# Patient Record
Sex: MALE | Race: WHITE | NOT HISPANIC OR LATINO | URBAN - METROPOLITAN AREA
[De-identification: names, ages, dates, MRNs, and addresses within clinical notes are randomized per-mention and may not be internally consistent; named-entity substitution may affect disease eponyms.]

---

## 2019-04-02 ENCOUNTER — EVALUATION (OUTPATIENT)
Dept: PHYSICAL THERAPY | Facility: CLINIC | Age: 69
End: 2019-04-02
Payer: MEDICARE

## 2019-04-02 DIAGNOSIS — G89.29 CHRONIC RIGHT SHOULDER PAIN: Primary | ICD-10-CM

## 2019-04-02 DIAGNOSIS — M25.511 CHRONIC RIGHT SHOULDER PAIN: Primary | ICD-10-CM

## 2019-04-02 PROCEDURE — G8991 OTHER PT/OT GOAL STATUS: HCPCS | Performed by: PHYSICAL THERAPIST

## 2019-04-02 PROCEDURE — 97110 THERAPEUTIC EXERCISES: CPT | Performed by: PHYSICAL THERAPIST

## 2019-04-02 PROCEDURE — G8990 OTHER PT/OT CURRENT STATUS: HCPCS | Performed by: PHYSICAL THERAPIST

## 2019-04-02 PROCEDURE — 97161 PT EVAL LOW COMPLEX 20 MIN: CPT | Performed by: PHYSICAL THERAPIST

## 2019-04-04 ENCOUNTER — TRANSCRIBE ORDERS (OUTPATIENT)
Dept: PHYSICAL THERAPY | Facility: CLINIC | Age: 69
End: 2019-04-04

## 2019-04-04 DIAGNOSIS — M25.511 CHRONIC RIGHT SHOULDER PAIN: Primary | ICD-10-CM

## 2019-04-04 DIAGNOSIS — G89.29 CHRONIC RIGHT SHOULDER PAIN: Primary | ICD-10-CM

## 2019-04-09 ENCOUNTER — OFFICE VISIT (OUTPATIENT)
Dept: PHYSICAL THERAPY | Facility: CLINIC | Age: 69
End: 2019-04-09
Payer: MEDICARE

## 2019-04-09 DIAGNOSIS — M25.511 CHRONIC RIGHT SHOULDER PAIN: Primary | ICD-10-CM

## 2019-04-09 DIAGNOSIS — G89.29 CHRONIC RIGHT SHOULDER PAIN: Primary | ICD-10-CM

## 2019-04-09 PROCEDURE — 97110 THERAPEUTIC EXERCISES: CPT

## 2019-04-09 PROCEDURE — 97140 MANUAL THERAPY 1/> REGIONS: CPT

## 2019-04-09 PROCEDURE — 97112 NEUROMUSCULAR REEDUCATION: CPT

## 2019-04-11 ENCOUNTER — OFFICE VISIT (OUTPATIENT)
Dept: PHYSICAL THERAPY | Facility: CLINIC | Age: 69
End: 2019-04-11
Payer: MEDICARE

## 2019-04-11 DIAGNOSIS — G89.29 CHRONIC RIGHT SHOULDER PAIN: Primary | ICD-10-CM

## 2019-04-11 DIAGNOSIS — M25.511 CHRONIC RIGHT SHOULDER PAIN: Primary | ICD-10-CM

## 2019-04-11 PROCEDURE — 97140 MANUAL THERAPY 1/> REGIONS: CPT

## 2019-04-11 PROCEDURE — 97112 NEUROMUSCULAR REEDUCATION: CPT

## 2019-04-11 PROCEDURE — 97110 THERAPEUTIC EXERCISES: CPT

## 2019-04-16 ENCOUNTER — OFFICE VISIT (OUTPATIENT)
Dept: PHYSICAL THERAPY | Facility: CLINIC | Age: 69
End: 2019-04-16
Payer: MEDICARE

## 2019-04-16 DIAGNOSIS — G89.29 CHRONIC RIGHT SHOULDER PAIN: Primary | ICD-10-CM

## 2019-04-16 DIAGNOSIS — M25.511 CHRONIC RIGHT SHOULDER PAIN: Primary | ICD-10-CM

## 2019-04-16 PROCEDURE — 97140 MANUAL THERAPY 1/> REGIONS: CPT | Performed by: PHYSICAL THERAPIST

## 2019-04-16 PROCEDURE — 97110 THERAPEUTIC EXERCISES: CPT | Performed by: PHYSICAL THERAPIST

## 2019-04-18 ENCOUNTER — OFFICE VISIT (OUTPATIENT)
Dept: PHYSICAL THERAPY | Facility: CLINIC | Age: 69
End: 2019-04-18
Payer: MEDICARE

## 2019-04-18 DIAGNOSIS — M25.511 CHRONIC RIGHT SHOULDER PAIN: Primary | ICD-10-CM

## 2019-04-18 DIAGNOSIS — G89.29 CHRONIC RIGHT SHOULDER PAIN: Primary | ICD-10-CM

## 2019-04-18 PROCEDURE — 97140 MANUAL THERAPY 1/> REGIONS: CPT

## 2019-04-18 PROCEDURE — 97110 THERAPEUTIC EXERCISES: CPT

## 2019-04-24 ENCOUNTER — APPOINTMENT (OUTPATIENT)
Dept: PHYSICAL THERAPY | Facility: CLINIC | Age: 69
End: 2019-04-24
Payer: MEDICARE

## 2019-04-26 ENCOUNTER — OFFICE VISIT (OUTPATIENT)
Dept: PHYSICAL THERAPY | Facility: CLINIC | Age: 69
End: 2019-04-26
Payer: MEDICARE

## 2019-04-26 DIAGNOSIS — G89.29 CHRONIC RIGHT SHOULDER PAIN: Primary | ICD-10-CM

## 2019-04-26 DIAGNOSIS — M25.511 CHRONIC RIGHT SHOULDER PAIN: Primary | ICD-10-CM

## 2019-04-26 PROCEDURE — 97110 THERAPEUTIC EXERCISES: CPT | Performed by: PHYSICAL THERAPIST

## 2019-04-26 PROCEDURE — 97140 MANUAL THERAPY 1/> REGIONS: CPT | Performed by: PHYSICAL THERAPIST

## 2019-04-30 ENCOUNTER — APPOINTMENT (OUTPATIENT)
Dept: PHYSICAL THERAPY | Facility: CLINIC | Age: 69
End: 2019-04-30
Payer: MEDICARE

## 2021-03-30 DIAGNOSIS — Z23 ENCOUNTER FOR IMMUNIZATION: ICD-10-CM

## 2021-06-22 ENCOUNTER — HOSPITAL ENCOUNTER (OUTPATIENT)
Dept: ULTRASOUND IMAGING | Facility: CLINIC | Age: 71
Discharge: HOME | End: 2021-06-22
Payer: MEDICARE

## 2021-06-22 DIAGNOSIS — N50.819 TESTICULAR PAIN, UNSPECIFIED: ICD-10-CM

## 2021-06-22 PROCEDURE — 76870 US EXAM SCROTUM: CPT

## 2021-10-28 ENCOUNTER — OFFICE VISIT (OUTPATIENT)
Dept: GASTROENTEROLOGY | Facility: CLINIC | Age: 71
End: 2021-10-28
Payer: MEDICARE

## 2021-10-28 VITALS
WEIGHT: 189 LBS | SYSTOLIC BLOOD PRESSURE: 140 MMHG | HEIGHT: 70 IN | HEART RATE: 73 BPM | DIASTOLIC BLOOD PRESSURE: 80 MMHG | BODY MASS INDEX: 27.06 KG/M2

## 2021-10-28 DIAGNOSIS — Z86.010 HISTORY OF COLON POLYPS: Primary | ICD-10-CM

## 2021-10-28 PROCEDURE — 99203 OFFICE O/P NEW LOW 30 MIN: CPT | Performed by: INTERNAL MEDICINE

## 2021-10-28 RX ORDER — FLUTICASONE FUROATE AND VILANTEROL TRIFENATATE 100; 25 UG/1; UG/1
POWDER RESPIRATORY (INHALATION)
COMMUNITY

## 2021-10-28 RX ORDER — CLONAZEPAM 0.5 MG/1
TABLET ORAL
COMMUNITY
Start: 2021-07-30

## 2021-10-28 RX ORDER — LISINOPRIL 5 MG/1
TABLET ORAL
COMMUNITY
Start: 2021-08-16

## 2021-10-28 RX ORDER — TADALAFIL 20 MG/1
TABLET ORAL DAILY
COMMUNITY

## 2021-10-28 RX ORDER — ROSUVASTATIN CALCIUM 10 MG/1
TABLET, COATED ORAL
COMMUNITY
Start: 2021-09-18

## 2021-11-26 ENCOUNTER — TELEPHONE (OUTPATIENT)
Dept: GASTROENTEROLOGY | Facility: HOSPITAL | Age: 71
End: 2021-11-26

## 2021-11-29 ENCOUNTER — ANESTHESIA (OUTPATIENT)
Dept: GASTROENTEROLOGY | Facility: HOSPITAL | Age: 71
End: 2021-11-29

## 2021-11-29 ENCOUNTER — ANESTHESIA EVENT (OUTPATIENT)
Dept: GASTROENTEROLOGY | Facility: HOSPITAL | Age: 71
End: 2021-11-29

## 2021-11-29 ENCOUNTER — HOSPITAL ENCOUNTER (OUTPATIENT)
Dept: GASTROENTEROLOGY | Facility: HOSPITAL | Age: 71
Setting detail: OUTPATIENT SURGERY
Discharge: HOME | End: 2021-11-29
Attending: INTERNAL MEDICINE
Payer: MEDICARE

## 2021-11-29 VITALS
RESPIRATION RATE: 18 BRPM | HEART RATE: 65 BPM | DIASTOLIC BLOOD PRESSURE: 80 MMHG | TEMPERATURE: 98.6 F | BODY MASS INDEX: 27.99 KG/M2 | OXYGEN SATURATION: 97 % | SYSTOLIC BLOOD PRESSURE: 121 MMHG | WEIGHT: 189 LBS | HEIGHT: 69 IN

## 2021-11-29 DIAGNOSIS — Z86.010 HISTORY OF COLON POLYPS: ICD-10-CM

## 2021-11-29 PROBLEM — G20.C PARKINSONIAN SYNDROME (HCC): Status: ACTIVE | Noted: 2021-11-29

## 2021-11-29 PROBLEM — G20 PARKINSONIAN SYNDROME (HCC): Status: ACTIVE | Noted: 2021-11-29

## 2021-11-29 PROCEDURE — G0105 COLORECTAL SCRN; HI RISK IND: HCPCS | Performed by: INTERNAL MEDICINE

## 2021-11-29 RX ORDER — LIDOCAINE HYDROCHLORIDE 20 MG/ML
INJECTION, SOLUTION EPIDURAL; INFILTRATION; INTRACAUDAL; PERINEURAL AS NEEDED
Status: DISCONTINUED | OUTPATIENT
Start: 2021-11-29 | End: 2021-11-29

## 2021-11-29 RX ORDER — SODIUM CHLORIDE, SODIUM LACTATE, POTASSIUM CHLORIDE, CALCIUM CHLORIDE 600; 310; 30; 20 MG/100ML; MG/100ML; MG/100ML; MG/100ML
INJECTION, SOLUTION INTRAVENOUS CONTINUOUS PRN
Status: DISCONTINUED | OUTPATIENT
Start: 2021-11-29 | End: 2021-11-29

## 2021-11-29 RX ORDER — PROPOFOL 10 MG/ML
INJECTION, EMULSION INTRAVENOUS AS NEEDED
Status: DISCONTINUED | OUTPATIENT
Start: 2021-11-29 | End: 2021-11-29

## 2021-11-29 RX ADMIN — PROPOFOL 150 MG: 10 INJECTION, EMULSION INTRAVENOUS at 09:48

## 2021-11-29 RX ADMIN — PROPOFOL 50 MG: 10 INJECTION, EMULSION INTRAVENOUS at 09:56

## 2021-11-29 RX ADMIN — PROPOFOL 50 MG: 10 INJECTION, EMULSION INTRAVENOUS at 09:52

## 2021-11-29 RX ADMIN — LIDOCAINE HYDROCHLORIDE 100 MG: 20 INJECTION, SOLUTION EPIDURAL; INFILTRATION; INTRACAUDAL; PERINEURAL at 09:48

## 2021-11-29 RX ADMIN — SODIUM CHLORIDE, SODIUM LACTATE, POTASSIUM CHLORIDE, AND CALCIUM CHLORIDE: .6; .31; .03; .02 INJECTION, SOLUTION INTRAVENOUS at 09:25

## 2023-08-29 ENCOUNTER — APPOINTMENT (EMERGENCY)
Dept: CT IMAGING | Facility: HOSPITAL | Age: 73
End: 2023-08-29
Payer: MEDICARE

## 2023-08-29 ENCOUNTER — HOSPITAL ENCOUNTER (EMERGENCY)
Facility: HOSPITAL | Age: 73
Discharge: HOME | End: 2023-08-29
Attending: EMERGENCY MEDICINE
Payer: MEDICARE

## 2023-08-29 VITALS
RESPIRATION RATE: 18 BRPM | SYSTOLIC BLOOD PRESSURE: 141 MMHG | HEART RATE: 65 BPM | DIASTOLIC BLOOD PRESSURE: 78 MMHG | OXYGEN SATURATION: 95 % | TEMPERATURE: 98.4 F

## 2023-08-29 DIAGNOSIS — H53.8 BLURRED VISION: Primary | ICD-10-CM

## 2023-08-29 PROCEDURE — 70450 CT HEAD/BRAIN W/O DYE: CPT

## 2023-08-29 PROCEDURE — 99284 EMERGENCY DEPT VISIT MOD MDM: CPT

## 2023-08-29 NOTE — ED PROVIDER NOTES
History  Chief Complaint   Patient presents with   • Blurred Vision     Blurred vision that started in right eye on Saturday after a sneeze. Sunday patient looked at a bright light and then had blurred vision in left eye. Patient was seen at [facility] doctor today and sent to ER for CT scan. Patient also reports hitting his head about 1 week or longer ago getting into a bus. Patient is a 72 y.o. male with a past medical history of prostate cancer, COPD, hyperlipidemia, HTN, parkinson's, REM behavioral disorder  presenting to the Emergency Department for evaluation of blurred vision. Patient reports on Saturday he had blurry vision in the peripheral of the right eye followed by resolution 10 minutes later. Patient reports this did occur after a sneeze. Patient reports on Sunday he was doing the [employer] [place] [employer] crossword puzzle turned his head towards a bright light and sound he had blurred vision in the peripheral zone of the left eye. Patient reports the blurred vision again lasted approximately 10 minutes and self resolved. Patient was seen by his ophthalmologist today had a complete work-up that was stated to be normal.  Patient did follow-up with his PCP today via phone call and was advised to come to the emergency department for CT scan. Patient reports he did hit his head approximately 1 week ago while riding on a bus. Patient reports he did not lose consciousness and is not on any blood thinners. Patient denies any visual changes since Sunday. Prior to Admission Medications   Prescriptions Last Dose Informant Patient Reported? Taking? Cholecalciferol 50 MCG (2000 UT) CAPS  Self Yes No   Sig: Vitamin D3 50 mcg (2,000 unit) capsule   Take by oral route.    ProAir  (90 Base) MCG/ACT inhaler  Self Yes No   carbidopa-levodopa (SINEMET)  mg per tablet  Self Yes No   clonazePAM (KlonoPIN) 0.5 mg tablet  Self Yes No   fluticasone-vilanterol (Breo Ellipta) 100-25 mcg/inh inhaler  Self Yes No Sig: Breo Ellipta 100 mcg-25 mcg/dose powder for inhalation   lisinopril (ZESTRIL) 5 mg tablet  Self Yes No   rosuvastatin (CRESTOR) 10 MG tablet  Self Yes No   tadalafil (Cialis) 20 MG tablet  Self Yes No   Sig: Daily      Facility-Administered Medications: None       Past Medical History:   Diagnosis Date   • Cancer ([address])    • Colon polyp    • COPD (chronic obstructive pulmonary disease) (HCC)    • Hyperlipidemia    • Hypertension    • Parkinson's syndrome (HCC)    • RBD (REM behavioral disorder)        Past Surgical History:   Procedure Laterality Date   • CARPAL TUNNEL RELEASE Bilateral    • HYDROCELE EXCISION / REPAIR     • KNEE CARTILAGE SURGERY Right    • NASAL SEPTUM SURGERY     • PROSTATECTOMY     • TONSILLECTOMY         Family History   Problem Relation Age of Onset   • Breast cancer Mother    • Lung cancer Mother    • Alzheimer's disease Father      I have reviewed and agree with the history as documented. E-Cigarette/Vaping   • E-Cigarette Use Never User      E-Cigarette/Vaping Substances   • Nicotine No    • THC No    • CBD No    • Flavoring No    • Other No    • Unknown No      Social History     Tobacco Use   • Smoking status: Former     Types: Cigarettes     Quit date: 10/28/2000     Years since quittin.8   • Smokeless tobacco: Never   Vaping Use   • Vaping Use: Never used   Substance Use Topics   • Alcohol use: Yes     Comment: Occasional   • Drug use: Yes     Types: Marijuana       Review of Systems   Constitutional: Negative for chills and fever. HENT: Negative for ear pain and sore throat. Eyes: Positive for visual disturbance (blurred- resolved). Negative for pain. Respiratory: Negative for cough and shortness of breath. Cardiovascular: Negative for chest pain and palpitations. Gastrointestinal: Negative for abdominal pain, constipation, diarrhea, nausea and vomiting. Genitourinary: Negative for dysuria and hematuria.    Musculoskeletal: Negative for arthralgias and back pain. Skin: Negative for color change and rash. Neurological: Negative for seizures and syncope. All other systems reviewed and are negative. Physical Exam  Physical Exam  Vitals and nursing note reviewed. Constitutional:       General: He is not in acute distress. Appearance: Normal appearance. He is well-developed. He is not toxic-appearing or diaphoretic. HENT:      Head: Normocephalic and atraumatic. Right Ear: External ear normal.      Left Ear: External ear normal.      Nose: Nose normal.      Mouth/Throat:      Mouth: Mucous membranes are moist.   Eyes:      General: Lids are normal. Vision grossly intact. No scleral icterus. Right eye: No discharge. Left eye: No discharge. Extraocular Movements: Extraocular movements intact. Conjunctiva/sclera: Conjunctivae normal.   Cardiovascular:      Rate and Rhythm: Normal rate and regular rhythm. Heart sounds: No murmur heard. Pulmonary:      Effort: Pulmonary effort is normal. No respiratory distress. Breath sounds: Normal breath sounds. Abdominal:      Palpations: Abdomen is soft. Tenderness: There is no abdominal tenderness. Musculoskeletal:         General: No swelling, deformity or signs of injury. Normal range of motion. Cervical back: Normal range of motion and neck supple. No rigidity. Skin:     General: Skin is warm and dry. Capillary Refill: Capillary refill takes less than 2 seconds. Coloration: Skin is not jaundiced. Findings: No erythema or rash. Neurological:      General: No focal deficit present. Mental Status: He is alert and oriented to person, place, and time. Mental status is at baseline. GCS: GCS eye subscore is 4. GCS verbal subscore is 5. GCS motor subscore is 6. Cranial Nerves: Cranial nerves 2-12 are intact. No cranial nerve deficit.       Gait: Gait normal.   Psychiatric:         Mood and Affect: Mood normal. Behavior: Behavior normal.         Thought Content: Thought content normal.         Judgment: Judgment normal.         Vital Signs  ED Triage Vitals   Temperature Pulse Respirations Blood Pressure SpO2   08/29/23 1417 08/29/23 1417 08/29/23 1417 08/29/23 1417 08/29/23 1417   98.4 °F (36.9 °C) 86 18 168/92 97 %      Temp src Heart Rate Source Patient Position - Orthostatic VS BP Location FiO2 (%)   -- 08/29/23 1529 08/29/23 1529 08/29/23 1529 --    Monitor Sitting Right arm       Pain Score       --                  Vitals:    08/29/23 1417 08/29/23 1529 08/29/23 1530   BP: 168/92 141/78 141/78   Pulse: 86 65    Patient Position - Orthostatic VS:  Sitting          Visual Acuity  Visual Acuity    Flowsheet Row Most Recent Value   Visual acuity R eye is 20/25   Visual acuity Left eye is 20/20   Visual acuity in both eyes is 20/25   Wearing corrective eyewear/lenses? Yes          ED Medications  Medications - No data to display    Diagnostic Studies  Results Reviewed     None                 CT head without contrast   Final Result by Charlotte Sweeney DO (08/29 1614)      No acute intracranial abnormality. Sinus disease as above. Workstation performed: [id number]                    Procedures  Procedures         ED Course             SBIRT 22yo+    Flowsheet Row Most Recent Value   Initial Alcohol Screen: US AUDIT-C     1. How often do you have a drink containing alcohol? 0 Filed at: 08/29/2023 1417   2. How many drinks containing alcohol do you have on a typical day you are drinking? 0 Filed at: 08/29/2023 1417   3a. Male UNDER 65: How often do you have five or more drinks on one occasion? 0 Filed at: 08/29/2023 1417   3b. FEMALE Any Age, or MALE 65+: How often do you have 4 or more drinks on one occassion? 0 Filed at: 08/29/2023 1417   Audit-C Score 0 Filed at: 08/29/2023 1417   YANIRA: How many times in the past year have you. ..     Used an illegal drug or used a prescription medication for non-medical reasons? Never Filed at: 08/29/2023 1417                    Medical Decision Making    This is a 72-year-old male present to the emergency department for evaluation of blurred vision. Patient reports having 2 episodes of blurred vision over the weekend 1 and HI. Patient reports both episodes were self resolving. Patient reports he did see his ophthalmologist today and had a full exam without abnormalities. Patient did speak to his PCP and was advised to come to the emergency department. Patient did request that I speak to the PCP prior to testing. Patient is in no acute distress, stable vital signs on his examination. I did speak to the patient's PCP, requesting CT head without contrast.    Differential diagnosis to include but is not limited to: Intracranial hemorrhage, mass, blurred vision    Initial ED Plan: Imaging    ED results: No acute intracranial abnormality    Final ED assessment: Patient is stable and well appearing. Discussed radiologic studies. Discussed follow-up with PCP and ophthalmologist. Strict return precautions were discussed including but not limited to visual changes, weakness, dizziness, headache. Patient verbalized understanding and is agreeable with the plan for discharge. Amount and/or Complexity of Data Reviewed  Radiology: ordered. Disposition  Final diagnoses:   Blurred vision     Time reflects when diagnosis was documented in both MDM as applicable and the Disposition within this note     Time User Action Codes Description Comment    8/29/2023  4:28 PM Alvin Angeles Add [H53.8] Blurred vision       ED Disposition     ED Disposition   Discharge    Condition   Stable    Date/Time   Tue Aug 29, 2023  4:28 PM    Comment   Sky Mcdonald discharge to home/self care.                Follow-up Information     Follow up With Specialties Details Why Contact Info Additional Information    Kyra Silver MD  Call in 3 days For follow up [facility] [address] [facility] Emergency Department Emergency Medicine Go to  If symptoms worsen [address] [facility] Emergency Department, [place], [address], [address]    your ophthalmologist  Call  For follow up            Discharge Medication List as of 8/29/2023  4:33 PM      CONTINUE these medications which have NOT CHANGED    Details   carbidopa-levodopa (SINEMET)  mg per tablet Starting Thu 9/16/2021, Historical Med      Cholecalciferol 50 MCG (2000 UT) CAPS Vitamin D3 50 mcg (2,000 unit) capsule   Take by oral route., Historical Med      clonazePAM (KlonoPIN) 0.5 mg tablet Starting Fri 7/30/2021, Historical Med      fluticasone-vilanterol (Breo Ellipta) 100-25 mcg/inh inhaler Breo Ellipta 100 mcg-25 mcg/dose powder for inhalation, Historical Med      lisinopril (ZESTRIL) 5 mg tablet Starting Mon 8/16/2021, Historical Med      ProAir  (90 Base) MCG/ACT inhaler Starting Thu 8/26/2021, Historical Med      rosuvastatin (CRESTOR) 10 MG tablet Starting Sat 9/18/2021, Historical Med      tadalafil (Cialis) 20 MG tablet Daily, Historical Med             No discharge procedures on file.     PDMP Review     None          ED Provider  Electronically Signed by           Rogelio Fitzgerald PA-C  08/29/23 [id number]

## 2023-08-29 NOTE — DISCHARGE INSTRUCTIONS
Follow up with PCP  Follow up with ophthalmologist   Return to the ED with new or worsening symptoms including but not limited to visual changes, pain, headache, dizziness

## 2025-05-07 ENCOUNTER — OFFICE VISIT (OUTPATIENT)
Age: 75
End: 2025-05-07
Payer: MEDICARE

## 2025-05-07 VITALS
SYSTOLIC BLOOD PRESSURE: 127 MMHG | OXYGEN SATURATION: 96 % | HEART RATE: 87 BPM | WEIGHT: 191.6 LBS | DIASTOLIC BLOOD PRESSURE: 80 MMHG | TEMPERATURE: 96.9 F | BODY MASS INDEX: 28.29 KG/M2 | RESPIRATION RATE: 18 BRPM

## 2025-05-07 DIAGNOSIS — M65.342 TRIGGER FINGER, LEFT RING FINGER: Primary | ICD-10-CM

## 2025-05-07 PROCEDURE — G0463 HOSPITAL OUTPT CLINIC VISIT: HCPCS | Performed by: PHYSICIAN ASSISTANT

## 2025-05-07 PROCEDURE — 99213 OFFICE O/P EST LOW 20 MIN: CPT | Performed by: PHYSICIAN ASSISTANT

## 2025-05-07 RX ORDER — LEVOCETIRIZINE DIHYDROCHLORIDE 5 MG/1
5 TABLET, FILM COATED ORAL EVERY EVENING
COMMUNITY

## 2025-05-07 RX ORDER — FLUTICASONE PROPIONATE AND SALMETEROL XINAFOATE 45; 21 UG/1; UG/1
AEROSOL, METERED RESPIRATORY (INHALATION)
COMMUNITY
End: 2025-05-12

## 2025-05-07 RX ORDER — LATANOPROST 50 UG/ML
SOLUTION/ DROPS OPHTHALMIC
COMMUNITY
Start: 2025-02-11

## 2025-05-07 NOTE — PATIENT INSTRUCTIONS
Continue to splint finger to reduce flexion to prevent trigger finger  You may take OTC Motrin 600 mg by mouth every 6 hours as needed for pain.   You may  take OTC Tylenol for pain. You may take no more than 1000 mg tabs every 6 hours (no more than 4 grams in 24 hours!).    Please follow up with orthopedics for possible steroid injection   front passenger

## 2025-05-07 NOTE — PROGRESS NOTES
[facility]        NAME: Greyson Chun is a 75 y.o. male  : 1950    MRN: [id number]  DATE: May 7, 2025  TIME: 4:18 PM      Assessment and Plan     Trigger finger, left ring finger [M65.342]  1. Trigger finger, left ring finger  Ambulatory Referral to Orthopedic Surgery          POC Testing Results        Note:       Patient Instructions     Patient Instructions   Continue to splint finger to reduce flexion to prevent trigger finger  You may take OTC Motrin 600 mg by mouth every 6 hours as needed for pain.   You may  take OTC Tylenol for pain. You may take no more than 1000 mg tabs every 6 hours (no more than 4 grams in 24 hours!).    Please follow up with orthopedics for possible steroid injection     Follow up with primary care provider.   Go to ER if symptoms worsen.    Chief Complaint     Chief Complaint   Patient presents with    Hand Pain     Pt thinks he have left ring trigger finger.          History of Present Illness     Pt reports since March his left ring finger will lock on him. He reports this is aggravated by the fact he has Parkinsons and tends to make fists while sleeping. He reports he has been using a brace which helps as it prevents full flexion of the finger so It does not lock. He is looking for a steroid injection. He denies injury, numbness or pain to the finger.        Review of Systems     Review of Systems   Constitutional:  Negative for fever.   Eyes:  Negative for redness.   Musculoskeletal:  Positive for joint swelling. Negative for arthralgias and myalgias.   Skin:  Negative for rash and wound.   Neurological:  Negative for numbness.         Current Medications       Current Outpatient Medications:     carbidopa-levodopa (SINEMET)  mg per tablet, , Disp: , Rfl:     fluticasone-salmeterol (Advair HFA) 45-21 MCG/ACT inhaler, , Disp: , Rfl:     latanoprost (XALATAN) 0.005 % ophthalmic solution, instill 1 drop into both eyes at bedtime, Disp: , Rfl:      levocetirizine (XYZAL) 5 MG tablet, Take 5 mg by mouth every evening, Disp: , Rfl:     lisinopril (ZESTRIL) 5 mg tablet, , Disp: , Rfl:     ProAir  (90 Base) MCG/ACT inhaler, , Disp: , Rfl:     rosuvastatin (CRESTOR) 10 MG tablet, , Disp: , Rfl:     semaglutide, 0.25 or 0.5 mg/dose, (Ozempic) 2 mg/3 mL injection pen, Inject 0.5 mg every week by subcutaneous route., Disp: , Rfl:     Cholecalciferol 50 MCG (2000 UT) CAPS, Vitamin D3 50 mcg (2,000 unit) capsule  Take by oral route. (Patient not taking: Reported on 5/7/2025), Disp: , Rfl:     clonazePAM (KlonoPIN) 0.5 mg tablet, , Disp: , Rfl:     fluticasone-vilanterol (Breo Ellipta) 100-25 mcg/inh inhaler, Breo Ellipta 100 mcg-25 mcg/dose powder for inhalation (Patient not taking: Reported on 5/7/2025), Disp: , Rfl:     tadalafil (Cialis) 20 MG tablet, Daily (Patient not taking: Reported on 5/7/2025), Disp: , Rfl:     Current Allergies     Allergies as of 05/07/2025    (No Known Allergies)              The following portions of the patient's history were reviewed and updated as appropriate: allergies, current medications, past family history, past medical history, past social history, past surgical history, and problem list.     Past Medical History:   Diagnosis Date    Cancer (HCC)     Colon polyp     COPD (chronic obstructive pulmonary disease) (HCC)     Hyperlipidemia     Hypertension     Parkinson's syndrome (HCC)     RBD (REM behavioral disorder)        Past Surgical History:   Procedure Laterality Date    CARPAL TUNNEL RELEASE Bilateral 2010    HYDROCELE EXCISION / REPAIR  2021    KNEE CARTILAGE SURGERY Right 2002    NASAL SEPTUM SURGERY      PROSTATECTOMY  2018    TONSILLECTOMY         Family History   Problem Relation Age of Onset    Breast cancer Mother     Lung cancer Mother     Alzheimer's disease Father          Medications have been verified.        Objective     /80   Pulse 87   Temp (!) 96.9 °F (36.1 °C)   Resp 18   Wt 86.9 kg (191 lb  9.6 oz)   SpO2 96%   BMI 28.29 kg/m²   No LMP for male patient.         Physical Exam     Physical Exam  Vitals and nursing note reviewed.   Constitutional:       Appearance: Normal appearance.   HENT:      Head: Normocephalic and atraumatic.   Eyes:      Conjunctiva/sclera: Conjunctivae normal.   Cardiovascular:      Rate and Rhythm: Normal rate and regular rhythm.      Pulses: Normal pulses.   Pulmonary:      Effort: Pulmonary effort is normal.   Musculoskeletal:      Left hand: Swelling present. No deformity, lacerations, tenderness or bony tenderness. Normal range of motion. Normal strength. Normal sensation. Normal capillary refill. Normal pulse.      Comments: Mild swelling over PIP joint of left ring finger; pt finger will occasionally lock in flexion but ROM intact. Nontender to palpation   Skin:     General: Skin is warm and dry.      Capillary Refill: Capillary refill takes less than 2 seconds.   Neurological:      General: No focal deficit present.      Mental Status: He is alert and oriented to person, place, and time. Mental status is at baseline.   Psychiatric:         Mood and Affect: Mood normal.         Behavior: Behavior normal.         Thought Content: Thought content normal.         Judgment: Judgment normal.

## 2025-05-12 VITALS — WEIGHT: 191 LBS | HEIGHT: 69 IN | BODY MASS INDEX: 28.29 KG/M2

## 2025-05-12 DIAGNOSIS — M65.342 TRIGGER FINGER, LEFT RING FINGER: ICD-10-CM

## 2025-05-12 PROCEDURE — 99203 OFFICE O/P NEW LOW 30 MIN: CPT | Performed by: FAMILY MEDICINE

## 2025-05-12 PROCEDURE — 20550 NJX 1 TENDON SHEATH/LIGAMENT: CPT | Performed by: FAMILY MEDICINE

## 2025-05-12 RX ORDER — MECLIZINE HYDROCHLORIDE 25 MG/1
TABLET ORAL
COMMUNITY

## 2025-05-12 RX ORDER — LORATADINE 10 MG/1
10 TABLET ORAL DAILY
COMMUNITY

## 2025-05-12 RX ORDER — OMEPRAZOLE 20 MG/1
20 TABLET, DELAYED RELEASE ORAL
COMMUNITY

## 2025-05-12 RX ORDER — BUPIVACAINE HYDROCHLORIDE 2.5 MG/ML
0.5 INJECTION, SOLUTION INFILTRATION; PERINEURAL
Status: COMPLETED | OUTPATIENT
Start: 2025-05-12 | End: 2025-05-12

## 2025-05-12 RX ORDER — AMOXICILLIN 500 MG
1400 CAPSULE ORAL DAILY
COMMUNITY

## 2025-05-12 RX ORDER — TRIAMCINOLONE ACETONIDE 40 MG/ML
20 INJECTION, SUSPENSION INTRA-ARTICULAR; INTRAMUSCULAR
Status: COMPLETED | OUTPATIENT
Start: 2025-05-12 | End: 2025-05-12

## 2025-05-12 RX ORDER — BIMATOPROST 0.1 MG/ML
SOLUTION/ DROPS OPHTHALMIC
COMMUNITY

## 2025-05-12 RX ORDER — FLUTICASONE PROPIONATE AND SALMETEROL 100; 50 UG/1; UG/1
1 POWDER RESPIRATORY (INHALATION) 2 TIMES DAILY
COMMUNITY

## 2025-05-12 RX ADMIN — BUPIVACAINE HYDROCHLORIDE 0.5 ML: 2.5 INJECTION, SOLUTION INFILTRATION; PERINEURAL at 11:00

## 2025-05-12 RX ADMIN — TRIAMCINOLONE ACETONIDE 20 MG: 40 INJECTION, SUSPENSION INTRA-ARTICULAR; INTRAMUSCULAR at 11:00
